# Patient Record
(demographics unavailable — no encounter records)

---

## 2022-06-20 RX ORDER — ALBUTEROL SULFATE 90 UG/1
AEROSOL, METERED RESPIRATORY (INHALATION)
COMMUNITY
Start: 2021-12-26 | End: 2022-09-20 | Stop reason: SDUPTHER

## 2022-06-28 RX ORDER — NAPROXEN 500 MG/1
TABLET ORAL
COMMUNITY

## 2022-06-28 RX ORDER — HYDROCHLOROTHIAZIDE 50 MG/1
TABLET ORAL
COMMUNITY

## 2022-06-28 RX ORDER — IBUPROFEN 600 MG/1
TABLET ORAL
COMMUNITY

## 2022-06-28 RX ORDER — INDOMETHACIN 50 MG/1
CAPSULE ORAL
COMMUNITY

## 2022-06-28 RX ORDER — IPRATROPIUM BROMIDE AND ALBUTEROL SULFATE 2.5; .5 MG/3ML; MG/3ML
SOLUTION RESPIRATORY (INHALATION)
COMMUNITY
Start: 2021-12-20 | End: 2022-09-20 | Stop reason: SDUPTHER

## 2022-06-28 RX ORDER — CLOBETASOL PROPIONATE 0.05 MG/G
GEL TOPICAL
COMMUNITY

## 2022-08-01 LAB
ALBUMIN SERPL-MCNC: 3.5 G/DL (ref 3.5–5.2)
ALBUMIN/GLOB SERPL: 1 {RATIO} (ref 1–2.7)
ALP SERPL-CCNC: 135 UNIT/L (ref 35–117)
ALT SERPL-CCNC: 75 UNIT/L (ref 0–35)
ANION GAP SERPL CALC-SCNC: 9 MMOL/L (ref 2–17)
APPEARANCE: CLEAR
AST SERPL-CCNC: 66 UNIT/L (ref 0–35)
BASOPHILS # BLD AUTO: 0 X10E3/MCL (ref 0–0.2)
BASOPHILS NFR BLD AUTO: 0.5 % (ref 0–2)
BILIRUB SERPL-MCNC: 0.62 MG/DL (ref 0–1.2)
BILIRUBIN, URINE, POC: NEGATIVE
BLOOD URINE, POC: NEGATIVE
BUN SERPL-MCNC: 17 MG/DL (ref 8–23)
CALCIUM SERPL-MCNC: 9.2 MG/DL (ref 8.8–10.2)
CHLORIDE SERPL-SCNC: 101 MMOL/L (ref 98–107)
CREAT SERPL-MCNC: 0.9 MG/DL (ref 0.5–1)
DEPRECATED HCO3 PLAS-SCNC: 34 MMOL/L (ref 22–29)
EOSINOPHIL # BLD AUTO: 0.1 X10E3/MCL (ref 0–0.5)
EOSINOPHIL NFR BLD AUTO: 1.4 % (ref 0–7)
ERYTHROCYTE [DISTWIDTH] IN BLOOD BY AUTOMATED COUNT: 13.6 % (ref 11–16)
GFR SERPL CREATININE-BSD FRML MDRD: 70 ML/MIN/1.73M²
GLOBULIN SER CALC-MCNC: 3.3 G/DL (ref 1.9–4.4)
GLUCOSE BLD-MCNC: 122 MG/DL (ref 65–110)
GLUCOSE SERPL-MCNC: 129 MG/DL (ref 70–99)
GLUCOSE URINE, POC: NEGATIVE MG/DL
HCT VFR BLD AUTO: 44 % (ref 34–47)
HGB BLD-MCNC: 13.9 G/DL (ref 11.5–15.7)
IMMATURE GRANS (ABS): 0.01 X10E3/MCL (ref 0–0.06)
IMMATURE GRANULOCYTES: 0.2 % (ref 0–0.6)
KETONES, URINE, POC: ABNORMAL MG/DL
LEUKOCYTE EST, POC: NEGATIVE
LYMPHOCYTES # SPEC AUTO: 1.5 X10E3/MCL (ref 1–3.2)
LYMPHOCYTES NFR BLD AUTO: 26 % (ref 15–45)
MCH RBC QN AUTO: 28.1 PG (ref 27–34.5)
MCHC RBC AUTO-ENTMCNC: 31.6 G/DL (ref 32–36)
MCV RBC AUTO: 89.1 FL (ref 81–99)
MONOCYTES # BLD AUTO: 0.6 X10E3/MCL (ref 0.3–1)
MONOCYTES NFR BLD AUTO: 10.3 % (ref 4–12)
NEUTROPHILS # BLD AUTO: 3.5 X10E3/MCL (ref 1.6–7.3)
NEUTROPHILS NFR BLD AUTO: 61.6 % (ref 42–74)
NITRATE, URINE POC: NEGATIVE
OSMOLALITY SERPL CALC.SUM OF ELEC: 290 MOSM/KG (ref 270–287)
PH, URINE, POC: 6.5 (ref 4.5–8)
PLATELET # BLD AUTO: 212 X10E3/MCL (ref 140–440)
PMV BLD AUTO: 11.6 FL (ref 7.2–13.2)
POTASSIUM SERPL-SCNC: 3.3 MMOL/L (ref 3.5–5.3)
PROT SERPL-MCNC: 6.8 G/DL (ref 6.4–8.3)
PROTEIN,URINE, POC: >=300
RBC # BLD AUTO: 4.94 X10E6/MCL (ref 3.6–5.2)
SODIUM SERPL-SCNC: 144 MMOL/L (ref 135–145)
SPECIFIC GRAVITY, URINE, POC: >=1.03 (ref 1–1.03)
URINALYSIS COLOR, POC: YELLOW
UROBILIN U POC: 1 EU/DL
WBC # BLD AUTO: 5.6 X10E3/MCL (ref 3.8–10.6)

## 2022-09-20 PROBLEM — M1A.0710 IDIOPATHIC CHRONIC GOUT OF RIGHT FOOT WITHOUT TOPHUS: Status: ACTIVE | Noted: 2022-09-20

## 2022-09-20 PROBLEM — M10.9 GOUT: Status: ACTIVE | Noted: 2022-09-20

## 2022-09-20 PROBLEM — J45.31 MILD PERSISTENT ASTHMA WITH ACUTE EXACERBATION: Status: ACTIVE | Noted: 2022-09-20

## 2022-09-20 PROBLEM — M79.671 PAIN IN RIGHT FOOT: Status: ACTIVE | Noted: 2022-09-20

## 2022-09-20 PROBLEM — N93.9 ABNORMAL VAGINAL BLEEDING: Status: ACTIVE | Noted: 2022-09-20

## 2022-09-20 PROBLEM — J45.909 ASTHMATIC BRONCHITIS: Status: ACTIVE | Noted: 2022-09-20

## 2022-09-20 PROBLEM — G47.33 OSA (OBSTRUCTIVE SLEEP APNEA): Status: ACTIVE | Noted: 2022-09-20

## 2022-09-20 PROBLEM — M54.30 SCIATICA: Status: ACTIVE | Noted: 2022-09-20

## 2022-09-20 PROBLEM — M17.0 PRIMARY OSTEOARTHRITIS OF BOTH KNEES: Status: ACTIVE | Noted: 2022-09-20

## 2022-09-20 PROBLEM — E01.0 THYROMEGALY: Status: ACTIVE | Noted: 2022-09-20

## 2022-09-20 PROBLEM — M17.9 OSTEOARTHRITIS OF KNEE: Status: ACTIVE | Noted: 2022-09-20

## 2022-09-20 PROBLEM — M79.609 PAIN IN LIMB: Status: ACTIVE | Noted: 2022-09-20

## 2022-09-20 PROBLEM — C44.310 BASAL CELL CARCINOMA OF FACE: Status: ACTIVE | Noted: 2022-09-20

## 2022-09-20 PROBLEM — M77.9 TENDONITIS: Status: ACTIVE | Noted: 2022-09-20

## 2022-09-20 PROBLEM — Z00.00 PREVENTATIVE HEALTH CARE: Status: ACTIVE | Noted: 2022-09-20

## 2022-09-20 PROBLEM — N93.9 ABNORMAL UTERINE BLEEDING: Status: ACTIVE | Noted: 2022-09-20

## 2022-09-20 PROBLEM — G62.9 PERIPHERAL NEUROPATHY: Status: ACTIVE | Noted: 2022-09-20
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
## 2022-09-20 PROBLEM — S89.92XA INJURY OF LEFT KNEE: Status: ACTIVE | Noted: 2022-09-20

## 2022-09-20 PROBLEM — E66.01 MORBID OBESITY (HCC): Status: ACTIVE | Noted: 2022-09-20

## 2022-09-20 PROBLEM — Z86.16 HISTORY OF COVID-19: Status: ACTIVE | Noted: 2022-09-20

## 2022-09-20 PROBLEM — I35.0 NONRHEUMATIC AORTIC VALVE STENOSIS: Status: ACTIVE | Noted: 2022-09-20

## 2022-09-20 PROBLEM — I47.1 PAROXYSMAL SUPRAVENTRICULAR TACHYCARDIA (HCC): Status: ACTIVE | Noted: 2022-09-20

## 2022-09-20 PROBLEM — M25.562 ACUTE PAIN OF LEFT KNEE: Status: ACTIVE | Noted: 2022-09-20

## 2022-09-20 PROBLEM — R41.3 MEMORY LOSS: Status: ACTIVE | Noted: 2022-09-20

## 2022-09-20 PROBLEM — G47.52 REM SLEEP BEHAVIOR DISORDER: Status: ACTIVE | Noted: 2022-09-20

## 2022-09-20 PROBLEM — E87.6 HYPOKALEMIA: Status: ACTIVE | Noted: 2022-09-20

## 2022-09-20 PROBLEM — I10 ESSENTIAL (PRIMARY) HYPERTENSION: Status: ACTIVE | Noted: 2022-09-20

## 2022-10-20 PROBLEM — Z00.00 PREVENTATIVE HEALTH CARE: Status: RESOLVED | Noted: 2022-09-20 | Resolved: 2022-10-20

## 2022-10-21 NOTE — ED NOTES
ED Patient Education Note     ;Patient Education Materials Follows:           Pneumonia, Adult  Pneumonia is an infection of the lungs. One type of pneumonia can happen while a person is in a hospital. A different type can happen when a person is not in a hospital (community-acquired pneumonia). It is easy for this kind to spread from person to person. It can spread to you if you breathe near an infected person who coughs or sneezes. Some symptoms include:   A dry cough.  A wet (productive) cough.  Fever.  Sweating.  Chest pain. HOME CARE   Take over-the-counter and prescription medicines only as told by your doctor. ? Only take cough medicine if you are losing sleep. ? If you were prescribed an antibiotic medicine, take it as told by your doctor. Do not stop taking the antibiotic even if you start to feel better.  Sleep with your head and neck raised (elevated). You can do this by putting a few pillows under your head, or you can sleep in a recliner.  Do not use tobacco products. These include cigarettes, chewing tobacco, and e-cigarettes. If you need help quitting, ask your doctor.  Drink enough water to keep your pee (urine) clear or pale yellow. A shot (vaccine) can help prevent pneumonia. Shots are often suggested for:   People older than 72years of age.  People older than 23years of age:  ? Who are having cancer treatment. ? Who have long-term (chronic) lung disease. ? Who have problems with their body's defense system (immune system). You may also prevent pneumonia if you take these actions:   Get the flu (influenza) shot every year.  Go to the dentist as often as told.  Wash your hands often. If soap and water are not available, use hand . GET HELP IF:   You have a fever.  You lose sleep because your cough medicine does not help. GET HELP RIGHT AWAY IF:   You are short of breath and it gets worse.  You have more chest pain.  Your sickness gets worse.  This is very serious if:  ? You are an older adult. ? Your body's defense system is weak.  You cough up blood. This information is not intended to replace advice given to you by your health care provider. Make sure you discuss any questions you have with your health care provider. Document Released: 06/05/2009 Document Revised: 09/07/2016 Document Reviewed: 04/13/2016  [employer] Patient Education [address].      Gastroenterology     Hypokalemia    Hypokalemia means that the amount of potassium in the blood is lower than normal. Potassium is a chemical (electrolyte) that helps regulate the amount of fluid in the body. It also stimulates muscle tightening (contraction) and helps nerves work properly. Normally, most of the body's potassium is inside cells, and only a very small amount is in the blood. Because the amount in the blood is so small, minor changes to potassium levels in the blood can be life-threatening. What are the causes? This condition may be caused by: Antibiotic medicine. Diarrhea or vomiting. Taking too much of a medicine that helps you have a bowel movement (laxative) can cause diarrhea and lead to hypokalemia. Chronic kidney disease (CKD). Medicines that help the body get rid of excess fluid (diuretics). Eating disorders, such as bulimia. Low magnesium levels in the body. Sweating a lot. What are the signs or symptoms? Symptoms of this condition include:   Weakness. Constipation. Fatigue. Muscle cramps. Mental confusion. Skipped heartbeats or irregular heartbeat (palpitations). Tingling or numbness. How is this diagnosed? This condition is diagnosed with a blood test.      How is this treated? This condition may be treated by:   Taking potassium supplements by mouth. Adjusting the medicines that you take. Eating more foods that contain a lot of potassium.       If your potassium level is very low, you may need to get potassium through an IV and be monitored in the hospital.      Follow these instructions at home:       Take over-the-counter and prescription medicines only as told by your health care provider. This includes vitamins and supplements. Eat a healthy diet. A healthy diet includes fresh fruits and vegetables, whole grains, healthy fats, and lean proteins. If instructed, eat more foods that contain a lot of potassium. This includes:  ? Nuts, such as peanuts and pistachios. ? Seeds, such as sunflower seeds and pumpkin seeds. ? Peas, lentils, and lima beans. ? Whole grain and bran cereals and breads. ? Fresh fruits and vegetables, such as apricots, avocado, bananas, cantaloupe, kiwi, oranges, tomatoes, asparagus, and potatoes. ? Orange juice. ? Tomato juice. ? Red meats. ? Yogurt. Keep all follow-up visits as told by your health care provider. This is important. Contact a health care provider if you:     Have weakness that gets worse. Feel your heart pounding or racing. Vomit. Have diarrhea. Have diabetes (diabetes mellitus) and you have trouble keeping your blood sugar (glucose) in your target range. Get help right away if you:     Have chest pain. Have shortness of breath. Have vomiting or diarrhea that lasts for more than 2 days. Faint. Summary     Hypokalemia means that the amount of potassium in the blood is lower than normal.     This condition is diagnosed with a blood test.     Hypokalemia may be treated by taking potassium supplements, adjusting the medicines that you take, or eating more foods that are high in potassium. If your potassium level is very low, you may need to get potassium through an IV and be monitored in the hospital.      This information is not intended to replace advice given to you by your health care provider.  Make sure you discuss any questions you have with your health care provider. Document Revised: 07/31/2019 Document Reviewed: 07/31/2019  Elsevier Patient Education ?  [address] [place].

## 2022-10-21 NOTE — ED NOTES
ED Patient Summary       ;          [facility]  [address], [place], [address] [place] [address]  [phone]  Discharge Instructions (Patient)  Bhargavi Matthew  :  1953                   MRN: [id number]                   FIN: [id number]  Reason For Visit: Nausea; COVID  +  /N/V  Final Diagnosis: Hypokalemia; Nausea; Pneumonia; Superficial bruising of abdominal wall; Transaminitis     Visit Date: 2022 08:41:00  Address:  [address]  Phone: [phone]     Emergency Department Providers:         Primary Physician:      [facility] would like to thank you for allowing us to assist you with your healthcare needs. The following includes patient education materials and information regarding your injury/illness. Follow-up Instructions: You were seen today on an emergency basis. Please contact your primary care doctor for a follow up appointment. If you received a referral to a specialist doctor, it is important you follow-up as instructed. It is important that you call your follow-up doctor to schedule and confirm the location of your next appointment. Your doctor may practice at multiple locations. The office location of your follow-up appointment may be different to the one written on your discharge instructions. If you do not have a primary care doctor, please call [phone] for help in finding a T[facility] Monge. [facility] Provider. For help in finding a specialist doctor, please call [phone]. If your condition gets worse before your follow-up with your primary care doctor or specialist, please return to the Emergency Department. Coronavirus 2019 (COVID-19) Reminders:     Patients age 15 - 24, with parental consent, and over age 25 can make an appointment for a COVID-19 vaccine.  Patients can contact their [facility] doctors' offices to schedule an appointment to receive the COVID-19 vaccine. Patients who do not have a [facility] physician can call [phone] to schedule vaccination appointments. Follow Up Appointments:  Primary Care Provider:     Name: PCP,  NONE     Phone:                  With: Address: When:   Follow up with primary care provider  Within 1 week   Comments: Follow-up with your doctor for recheck. You will need to have your potassium and your liver enzymes rechecked in 1 to 2 weeks. Return to the ER with worsening symptoms              [address] SERVICES%>             New Medications  Printed Prescriptions  levofloxacin (Levaquin 750 mg oral tablet) 1 Tabs Oral (given by mouth) every day for 5 Days. Refills: 0. Last Dose:____________________  ondansetron (Zofran ODT 4 mg oral tablet, disintegrating) 1 Tabs Oral (given by mouth) every 8 hours as needed nausea/vomiting for 3 Days. Refills: 0. Last Dose:____________________  Medications That Were Updated - Follow Current Instructions  Printed Prescriptions  Current potassium chloride (potassium chloride 20 mEq oral tablet, extended release) 1 Tabs Oral (given by mouth) 2 times a day for 5 Days. Refills: 0. Last Dose:____________________    Other Medications  Current potassium chloride (Klor-Con M20 oral tablet, extended release) 0.5 Tabs Oral (given by mouth) once a day (in the morning). Last Dose:____________________    Medications that have not changed  Other Medications  allopurinol (allopurinol 300 mg oral tablet) 1 Tabs Oral (given by mouth) once a day (in the morning). Last Dose:____________________  apixaban (Eliquis 5 mg oral tablet) 1 Tabs Oral (given by mouth) 2 times a day. Refills: 0. Last Dose:____________________  bumetanide (bumetanide 2 mg oral tablet) 1 Tabs Oral (given by mouth) once a day (in the morning).   Last Dose:____________________  diclofenac topical (diclofenac 1% topical gel) 1 Application Topical (on the skin) 4 times a day as needed pain. Last Dose:____________________  melatonin (Melatonin) 5 Milligram Oral (given by mouth) Once a Day (at bedtime). Last Dose:____________________  molnupiravir (molnupiravir 200 mg oral capsule) TAKE 4 CAPSULES BY MOUTH EVERY 12 HOURS FOR 5 DAYS. Last Dose:____________________  montelukast (montelukast 10 mg oral tablet) 1 Tabs Oral (given by mouth) once a day (in the morning). Last Dose:____________________      Allergy Info: Adhesive Bandage; codeine     Discharge Additional Information          Discharge Patient 08/01/22 10:09:00 EDT      Patient Education Materials:        Hypokalemia    Hypokalemia means that the amount of potassium in the blood is lower than normal. Potassium is a chemical (electrolyte) that helps regulate the amount of fluid in the body. It also stimulates muscle tightening (contraction) and helps nerves work properly. Normally, most of the body's potassium is inside cells, and only a very small amount is in the blood. Because the amount in the blood is so small, minor changes to potassium levels in the blood can be life-threatening. What are the causes? This condition may be caused by: Antibiotic medicine. Diarrhea or vomiting. Taking too much of a medicine that helps you have a bowel movement (laxative) can cause diarrhea and lead to hypokalemia. Chronic kidney disease (CKD). Medicines that help the body get rid of excess fluid (diuretics). Eating disorders, such as bulimia. Low magnesium levels in the body. Sweating a lot. What are the signs or symptoms? Symptoms of this condition include:   Weakness. Constipation. Fatigue. Muscle cramps. Mental confusion. Skipped heartbeats or irregular heartbeat (palpitations). Tingling or numbness. How is this diagnosed? This condition is diagnosed with a blood test.      How is this treated?     This condition may be treated by:   Taking potassium supplements by mouth. Adjusting the medicines that you take. Eating more foods that contain a lot of potassium. If your potassium level is very low, you may need to get potassium through an IV and be monitored in the hospital.      Follow these instructions at home:       Take over-the-counter and prescription medicines only as told by your health care provider. This includes vitamins and supplements. Eat a healthy diet. A healthy diet includes fresh fruits and vegetables, whole grains, healthy fats, and lean proteins. If instructed, eat more foods that contain a lot of potassium. This includes:  ? Nuts, such as peanuts and pistachios. ? Seeds, such as sunflower seeds and pumpkin seeds. ? Peas, lentils, and lima beans. ? Whole grain and bran cereals and breads. ? Fresh fruits and vegetables, such as apricots, avocado, bananas, cantaloupe, kiwi, oranges, tomatoes, asparagus, and potatoes. ? Orange juice. ? Tomato juice. ? Red meats. ? Yogurt. Keep all follow-up visits as told by your health care provider. This is important. Contact a health care provider if you:     Have weakness that gets worse. Feel your heart pounding or racing. Vomit. Have diarrhea. Have diabetes (diabetes mellitus) and you have trouble keeping your blood sugar (glucose) in your target range. Get help right away if you:     Have chest pain. Have shortness of breath. Have vomiting or diarrhea that lasts for more than 2 days. Faint. Summary     Hypokalemia means that the amount of potassium in the blood is lower than normal.     This condition is diagnosed with a blood test.     Hypokalemia may be treated by taking potassium supplements, adjusting the medicines that you take, or eating more foods that are high in potassium.      If your potassium level is very low, you may need to get potassium through an IV and be monitored in the hospital.      This information is not intended to replace advice given to you by your health care provider. Make sure you discuss any questions you have with your health care provider. Document Revised: 07/31/2019 Document Reviewed: 07/31/2019  [employer] Patient Education ? [address] [address] [place].             Pneumonia, Adult  Pneumonia is an infection of the lungs. One type of pneumonia can happen while a person is in a hospital. A different type can happen when a person is not in a hospital (community-acquired pneumonia). It is easy for this kind to spread from person to person. It can spread to you if you breathe near an infected person who coughs or sneezes. Some symptoms include:   A dry cough.  A wet (productive) cough.  Fever.  Sweating.  Chest pain. HOME CARE   Take over-the-counter and prescription medicines only as told by your doctor. ? Only take cough medicine if you are losing sleep. ? If you were prescribed an antibiotic medicine, take it as told by your doctor. Do not stop taking the antibiotic even if you start to feel better.  Sleep with your head and neck raised (elevated). You can do this by putting a few pillows under your head, or you can sleep in a recliner.  Do not use tobacco products. These include cigarettes, chewing tobacco, and e-cigarettes. If you need help quitting, ask your doctor.  Drink enough water to keep your pee (urine) clear or pale yellow. A shot (vaccine) can help prevent pneumonia. Shots are often suggested for:   People older than 72years of age.  People older than 23years of age:  ? Who are having cancer treatment. ? Who have long-term (chronic) lung disease. ? Who have problems with their body's defense system (immune system). You may also prevent pneumonia if you take these actions:   Get the flu (influenza) shot every year.  Go to the dentist as often as told.  Wash your hands often.  If soap and water are not available, use hand . GET HELP IF:   You have a fever.  You lose sleep because your cough medicine does not help. GET HELP RIGHT AWAY IF:   You are short of breath and it gets worse.  You have more chest pain.  Your sickness gets worse. This is very serious if:  ? You are an older adult. ? Your body's defense system is weak.  You cough up blood. This information is not intended to replace advice given to you by your health care provider. Make sure you discuss any questions you have with your health care provider. Document Released: 06/05/2009 Document Revised: 09/07/2016 Document Reviewed: 04/13/2016  [employer] Patient Education [address] [facility]      ---------------------------------------------------------------------------------------------------------------------  [facility] allows patients to review your COVID and other test results as well as discharge documents from any [facility], Emergency Department, surgical center or outpatient lab. Test results are typically available 36 hours after the test is completed. [address] encourages you to self-enroll in the [facility] Patient Portal.     To begin your self-enrollment process, please visit [email or web address] Under [facility], click on Sign up now. NOTE: You must be 16 years and older to use [facility] Self-Enroll online. If you are a parent, caregiver, or guardian; you need an invite to access your childs or dependents health records. To obtain an invite, contact the Medical Records department at [phone] Monday through Friday, 8-4:30, select option 3 . If we receive your call afterhours, we will return your call the next business day. If you have issues trying to create or access your account, contact [employer] at [phone] available 7 days a week 24 hours a day.      Comment:

## 2022-10-21 NOTE — DISCHARGE SUMMARY
ED Clinical Summary                         [facility]  [address]  [place], [place], [address] [phone]           PERSON INFORMATION  Name: Yumiko Lai Age:  76 Years : 1953   Sex: Female Language: English PCP: PCP,  NONE   Marital Status:   Phone: [phone] Med Service: MED-Medicine   MRN:  [id number] Acct# [[id number]] Arrival: 2022 08:41:00   Visit Reason: Nausea; COVID  +  /N/V Acuity: 3 LOS: 000 01:34   Address:      [address] [place] [id number]  Diagnosis:      Hypokalemia; Nausea; Pneumonia; Superficial bruising of abdominal wall; Transaminitis  Printed Prescriptions: Allergies      codeine (Vomiting)      Adhesive Bandage (Burning) (Rash) (Itching)      Medications Administered During Visit:                  Medication Dose Route   ondansetron 4 mg IV Push   potassium chloride 40 mEq Oral       Patient Medication List:              allopurinol (allopurinol 300 mg oral tablet) 1 Tabs Oral (given by mouth) once a day (in the morning). apixaban (Eliquis 5 mg oral tablet) 1 Tabs Oral (given by mouth) 2 times a day. Refills: 0.  bumetanide (bumetanide 2 mg oral tablet) 1 Tabs Oral (given by mouth) once a day (in the morning). diclofenac topical (diclofenac 1% topical gel) 1 Application Topical (on the skin) 4 times a day as needed pain. levofloxacin (Levaquin 750 mg oral tablet) 1 Tabs Oral (given by mouth) every day for 5 Days. Refills: 0.  melatonin (Melatonin) 5 Milligram Oral (given by mouth) Once a Day (at bedtime). molnupiravir (molnupiravir 200 mg oral capsule) TAKE 4 CAPSULES BY MOUTH EVERY 12 HOURS FOR 5 DAYS. montelukast (montelukast 10 mg oral tablet) 1 Tabs Oral (given by mouth) once a day (in the morning). ondansetron (Zofran ODT 4 mg oral tablet, disintegrating) 1 Tabs Oral (given by mouth) every 8 hours as needed nausea/vomiting for 3 Days.  Refills: 0.  potassium chloride (Klor-Con M20 oral tablet, extended release) 0.5 Tabs Oral (given by mouth) once a day (in the morning). potassium chloride (potassium chloride 20 mEq oral tablet, extended release) 1 Tabs Oral (given by mouth) 2 times a day for 5 Days. Refills: 0. Major Tests and Procedures: The following procedures and tests were performed during your ED visit. COMMONPROCEDURES%>  COMMON PROCEDURESCOMMENTS%>          Laboratory Orders  Name Status Details   . Glu POC Completed Blood, RT, RT - Routine, Collected, 08/01/22 9:28:53 EDT, Nurse collect, 08/01/22 9:28:53 /[place], [id number] POC Login   . UA POC Completed Urine, RT, RT - Routine, Collected, 08/01/22 9:58:00 EDT, Nurse collect, 08/01/22 9:58:00 /[place], RAL POC Login   CBCDIFF Completed Blood, Stat, ST - Stat, 08/01/22 9:02:00 EDT, 08/01/22 9:02:00 EDT, Nurse collect, Bella MCFADDEN, Print label Y/N   CMP Completed Blood, Stat, ST - Stat, 08/01/22 9:02:00 EDT, 08/01/22 9:02:00 EDT, Nurse collectCompa, Print label Y/N               Radiology Orders  Name Status Details   XR Chest 1 View Portable Completed 08/01/22 9:02:00 EDT, STAT 1 hour or less, Reason: Other abnormalities of breathing, Transport Mode: Portable, pp_set_radiology_subspecialty               Patient Care Orders  Name Status Details   COVID-19 Status Ordered 08/01/22 8:42:03 EDT, NOT [facility] pharmacy, laboratory, radiology. , 08/01/22 8:42:03 EDT, COVID-19 Detected   Discharge Patient Ordered 08/01/22 10:09:00 EDT   ED Assessment Adult Completed 08/01/22 8:47:54 EDT, 08/01/22 8:47:54 EDT   ED Secondary Triage Completed 08/01/22 8:47:54 EDT, 08/01/22 8:47:54 EDT   ED Triage Adult Completed 08/01/22 8:42:03 EDT, 08/01/22 8:42:03 EDT   POC-Blood Glucose Monitoring Completed 08/01/22 9:02:00 EDT, Once, 08/01/22 9:02:00 EDT   POC-Urine Dipstick collect Completed 08/01/22 9:10:00 EDT, Once, 08/01/22 9:10:00 EDT   Place Isolation Order Ordered 08/01/22 8:42:04 EDT, 08/01/22 8:42:04 EDT   Saline Lock Insert Completed 08/01/22 9:02:00 EDT, Once, 08/01/22 9:02:00 EDT             PROVIDER INFORMATION               Provider Role Assigned Wyona Eisenmenger, [employer] N-RN ED Nurse 8/1/2022 08:49:21    Nadia Cardona [facility] ED Provider 8/1/2022 08:50:03    Yasmin DE LA ROSA ED Provider 8/1/2022 08:50:08 8/1/2022 08:50:13       Attending Physician:  Nadia MCFADDEN     Admit Doc  Nadia MCFADDEN     Consulting Doc       VITALS INFORMATION  Vital Sign Triage Latest   Temp Oral ORAL_1%>36.6 degC ORAL%>36.6 degC   Temp Temporal TEMPORAL_1%> TEMPORAL%>   Temp Intravascular INTRAVASCULAR_1%> INTRAVASCULAR%>   Temp Axillary AXILLARY_1%> AXILLARY%>   Temp Rectal RECTAL_1%> RECTAL%>   02 Sat 96 % 95 %   Respiratory Rate RATE_1%>18 br/min RATE%>18 br/min   Peripheral Pulse Rate PULSE RATE_1%> PULSE RATE%>   Apical Heart Rate HEART RATE_1%> HEART RATE%>   Blood Pressure BLOOD PRESSURE_1%>/ BLOOD PRESSURE_1%>73 mmHg BLOOD PRESSURE%>106 mmHg / BLOOD PRESSURE%>65 mmHg                 Immunizations      No Immunizations Documented This Visit          DISCHARGE INFORMATION   Discharge Disposition: H Outpt-Sent Home   Discharge Location:    Home   Discharge Date and Time:    8/1/2022 10:15:00   ED Checkout Date and Time:    8/1/2022 10:15:00     DEPART REASON INCOMPLETE INFORMATION               Depart Action Incomplete Reason   Interactive View/I&O Recently assessed               Problems      No Problems Documented              Smoking Status      Never (less than 100 in lifetime)         PATIENT EDUCATION INFORMATION  Instructions:       Hypokalemia; Pneumonia, Adult, Easy-to-Read ([id number])     Follow up:                    With: Address: When:   Follow up with primary care provider  Within 1 week   Comments: Follow-up with your doctor for recheck. You will need to have your potassium and your liver enzymes rechecked in 1 to 2 weeks.  Return to the ER with worsening symptoms ED PROVIDER DOCUMENTATION     Patient:   Howie Jackson            MRN: [id number]            FIN: [id number]               Age:   76 years     Sex:  Female     :  1953   Associated Diagnoses:   Nausea; Superficial bruising of abdominal wall; Transaminitis; Hypokalemia; Pneumonia   Author:   Chasidy MCFADDEN      Basic Information   Time seen: Provider Seen (ST)   ED Provider/Time:    Chasidy MCFADDEN / 2022 08:50  . Additional information: Chief Complaint from Nursing Triage Note   Chief Complaint  Chief Complaint: continued nausea and malaise since dx c covid monday. denies sob. nad (22 08:43:00). History of Present Illness   This is a 51-year-old female with history of atrial fibrillation newly on Eliquis in addition to CHF who presents for evaluation of nausea. She tested positive for COVID last Monday. Over the weekend she had some chills. She was hospitalized during that weekend but not tested for COVID. She was seen at an [facility] and believes she completed a course of an antiviral.  Says she is out of Zofran and says this normally helps with nausea, but has not have anything for her nausea at this time. Denies any vomiting, abdominal pain or diarrhea. No dysuria. Denies fever or chills. She still has a productive cough. She is not short of breath. Does report that she has a bruise to her right lower quadrant and site of Lovenox injection. It is not painful, but she does have a large area of ecchymosis. Review of Systems   Constitutional symptoms:  No fever,    Respiratory symptoms:  Cough. Gastrointestinal symptoms:  Nausea, no abdominal pain, no vomiting, no diarrhea. Genitourinary symptoms:  No dysuria,    Hematologic/Lymphatic symptoms:  Bruising tendency. Additional review of systems information: All other systems reviewed and otherwise negative. Health Status   Allergies:     Allergic Reactions Oxygen Information   8/1/2022 8:53 EDT Oxygen Therapy Room air   8/1/2022 8:43 EDT Oxygen Therapy Room air    SpO2 96 %   . General:  Alert, no acute distress. Skin:  Warm, dry, pink, intact, Moderate sized superficial ecchymosis/hematoma to right lower quadrant of the abdomen. Patient reports this was in the site of her Lovenox injection. It is not firm or tender to touch. No active bleeding. Head:  Normocephalic, atraumatic. Neck:  Supple, trachea midline, no tenderness. Eye:  Pupils are equal, round and reactive to light, extraocular movements are intact, normal conjunctiva. Ears, nose, mouth and throat:  Oral mucosa moist.   Cardiovascular:  Regular rate and rhythm, No murmur, Normal peripheral perfusion, No edema. Respiratory:  Lungs are clear to auscultation, respirations are non-labored, breath sounds are equal, Symmetrical chest wall expansion. Chest wall:  No tenderness. Back:  Nontender, Normal range of motion. Musculoskeletal:  Normal ROM, normal strength, no swelling. Gastrointestinal:  Soft, Nontender, Non distended, Guarding: Negative, Rebound: Negative. Neurological:  Alert and oriented to person, place, time, and situation, No focal neurological deficit observed. Lymphatics:  No lymphadenopathy. Medical Decision Making   Differential Diagnosis:  Pancreatitis, hepatitis, electrolyte abnormality, viral syndrome, Pneumonia. Documents reviewed:  Emergency department nurses' notes, emergency department records, prior records. Electrocardiogram:  Emergency Provider interpretation performed by me, See ECG ED Review.     Results review:  Lab results : Lab View   8/1/2022 9:58 EDT Appear U POC Clear    Color U POC Yellow    Bili U POC Negative    Blood U POC Negative    Glucose U POC Negative mg/dL    Ketones U POC Trace mg/dL    Leuk Est U POC Negative    Nitrite U POC Negative    pH U POC 6.5    Protein U POC >=300    Spec Grav U POC >=1.030    Urobilin U POC 1.0 EU/dL   8/1/2022 9:51 EDT Estimated Creatinine Clearance 82.61 mL/min   8/1/2022 9:28 EDT Glucose .0 mg/dL  HI   8/1/2022 9:17 EDT WBC 5.6 x10e3/mcL    RBC 4.94 x10e6/mcL    Hgb 13.9 g/dL    HCT 44.0 %    MCV 89.1 fL    MCH 28.1 pg    MCHC 31.6 g/dL  LOW    RDW 13.6 %    Platelet 681 K55I3/GCD    MPV 11.6 fL    Neutro Auto 61.6 %    Neutro Absolute 3.5 x10e3/mcL    Immature Grans Percent 0.2 %    Immature Grans Absolute 0.01 x10e3/mcL    Lymph Auto 26.0 %    Lymph Absolute 1.5 x10e3/mcL    Mono Auto 10.3 %    Mono Absolute 0.6 x10e3/mcL    Eosinophil Percent 1.4 %    Eos Absolute 0.1 x10e3/mcL    Basophil Auto 0.5 %    Baso Absolute 0.0 x10e3/mcL    Sodium Lvl 144 mmol/L    Potassium Lvl 3.3 mmol/L  LOW    Chloride 101 mmol/L    CO2 34 mmol/L  HI    Glucose Random 129 mg/dL  HI    BUN 17 mg/dL    Creatinine Lvl 0.9 mg/dL    AGAP 9 mmol/L    Osmolality Calc 290 mOsm/kg  HI    Calcium Lvl 9.2 mg/dL    Protein Total 6.8 g/dL    Albumin Lvl 3.5 g/dL    Globulin Calc 3.3 g/dL    AG Ratio Calc 1.00    Alk Phos 135 unit/L  HI    AST 66 unit/L  HI    ALT 75 unit/L  HI    eGFR 70 mL/min/1.73mÂ²  LOW    Bili Total 0.62 mg/dL   8/1/2022 8:47 EDT Estimated Creatinine Clearance 106.21 mL/min   . Radiology results:  Rad Results (ST)   XR Chest 1 View Portable  ?  08/01/22 09:23:04  XR Chest 1 View Portable 08/01/22    INDICATION: Other abnormalities of breathing    COMPARISON: 23 July, 2022    TECHNIQUE: Single view    FINDINGS: Interval clearing of pulmonary infiltrates with some residual  infiltrate present most apparent in the right mid zone and in the left upper  lobe. The left upper lobe infiltrate is partially obscured by artifact. The  cardiac size is normal. The pulmonary vasculature is not congested. IMPRESSION: There is artifact over the chest. Right mid zone and left upper lobe  clinical correlation  Recommended.   ?  Signed By: Millie MARQUES  .   Will screen with basic labs to rule out any significant electrolyte disturbance, transaminitis or anemia. Patient does not look dehydrated. She has normal vital signs. We will give IV Zofran. Nausea likely secondary to mild persistent viral syndrome. She does report a productive cough we will obtain a chest x-ray. Lungs are clear and room air sats are greater than 95%. She does not appear short of breath      Reexamination/ Reevaluation   Given evidence of pulmonary infiltrate, I will put the patient on Levaquin. She does report her cough is more productive. She is got mild transaminitis. No recent prior for comparison. Recommend follow-up with her PCP for recheck. Potassium is a little low which was orally repleted. Plan for discharge with follow-up with her regular doctor      Impression and Plan   Diagnosis   Nausea (GKS54-FZ R11.0, Discharge, Medical)   Superficial bruising of abdominal wall (KHQ77-WS S30. 1XXA, Discharge, Medical)   Transaminitis (IZL85-ZF R74.01, Discharge, Medical)   Pneumonia (ZZM14-MT J18.9, Discharge, Medical)   Hypokalemia (HHE87-NZ E87.6, Discharge, Medical)   Plan   Condition: Improved. Disposition: Discharged: Time  8/1/2022 10:08:00, to home. Prescriptions: Launch prescriptions   Pharmacy:  Levaquin 750 mg oral tablet (Prescribe): 750 mg, 1 tabs, Oral, Daily, for 5 days, 5 tabs, 0 Refill(s)  potassium chloride 20 mEq oral tablet, extended release (Prescribe): 20 mEq, 1 tabs, Oral, BID, for 5 days, 10 tabs, 0 Refill(s), Launch prescriptions   Pharmacy:  Zofran ODT 4 mg oral tablet, disintegrating (Prescribe): 4 mg, 1 tabs, Oral, q8hr, for 3 days, PRN: nausea/vomiting, 9 tabs, 0 Refill(s). Patient was given the following educational materials: Pneumonia, Adult, Easy-to-Read ([id number]), Hypokalemia. Follow up with: Follow up with primary care provider Within 1 week Follow-up with your doctor for recheck. You will need to have your potassium and your liver enzymes rechecked in 1 to 2 weeks.   Return to the ER with worsening symptoms. Counseled: I had a detailed discussion with the patient and/or guardian regarding the historical points/exam findings supporting the discharge diagnosis and need for outpatient followup. Discussed the need to return to the ER if symptoms persist/worsen, or for any questions/concerns that arise at home.

## 2022-10-21 NOTE — PROGRESS NOTES
Pharmacy Clinical Interventions - Text       Pharmacy Clinical Interventions Entered On:  8/1/2022 9:57 EDT    Performed On:  8/1/2022 9:57 EDT by LATRICE Bolaños               Interventions   Intervention Type Pharmacy :   Optimize monitoring   Associated Order(s) Pharmacy :   K = 3.3   Clinical Importance Pharmacy :   Routine safety/clinical monitoring with no change needed   Medication Safety Reporting Pharmacy :   Non Medication Event   Pharmacist Intervention Time :   1-5 Minutes   Archie CUETO, LATRICE ROMAN - 8/1/2022 9:57 EDT

## 2022-10-21 NOTE — ED NOTES
ED Triage Note       ED Secondary Triage Entered On:  8/1/2022 8:53 EDT    Performed On:  8/1/2022 8:51 EDT by Alexi Patient N-RN               General Information   Barriers to Learning :   None evident   COVID-19 Vaccine Status :   Not received   ED Home Meds Section :   Document assessment   [facility] ED Fall Risk Section :   Document assessment   ED Advance Directives Section :   Document assessment   ED Palliative Screen :   Document assessment   Alexi Patient N-RN - 8/1/2022 8:51 EDT   (As Of: 8/1/2022 08:53:08 EDT)   Problems(Active)    Acid reflux (SNOMED CT  :923270424 )  Name of Problem:   Acid reflux ; Recorder:   Regine Wells RN, Joao Sher; Confirmation:   Confirmed ; Classification:   Patient Stated ; Code:   [id number] ; Contributor System:   PowerChart ; Last Updated:   3/3/2021 14:24 EST ; Life Cycle Date:   3/3/2021 ; Life Cycle Status:   Active ; Vocabulary:   SNOMED CT        CHF (congestive heart failure) (SNOMED CT  :37812548 )  Name of Problem:   CHF (congestive heart failure) ; Recorder:   Joao Snyder RN; Confirmation:   Confirmed ; Classification:   Patient Stated ; Code:   00228881 ; Contributor System:   PowerChart ; Last Updated:   3/3/2021 14:23 EST ; Life Cycle Date:   3/3/2021 ; Life Cycle Status:   Active ; Vocabulary:   SNOMED CT        Claustrophobia (SNOMED CT  :18248451 )  Name of Problem:   Claustrophobia ; Recorder:   Mayito Brown; Confirmation:   Confirmed ; Classification:   Patient Stated ; Code:   04860539 ; Contributor System:   PowerChart ; Last Updated:   9/18/2018 12:41 EDT ; Life Cycle Date:   9/18/2018 ; Life Cycle Status:   Active ; Vocabulary:   SNOMED CT   ; Comments:        9/18/2018 12:41 - Maggie Ruano RN, Dhiraj Avilez  DOES OK WITH ELEVATORS      Heart murmur (SNOMED CT  :726647725 )  Name of Problem:   Heart murmur ; Recorder:   Mayito rBown; Confirmation:   Confirmed ; Classification:   Patient Stated ; Code:   534420226 ; Contributor System:   PowerChart ;  Last Updated: 9/18/2018 12:15 EDT ; Life Cycle Date:   9/18/2018 ; Life Cycle Status:   Active ; Vocabulary:   SNOMED CT        History of basal cell carcinoma (BCC) excision (SNOMED CT  :306755895 )  Name of Problem:   History of basal cell carcinoma (BCC) excision ; Recorder:   Jordan Perla RN, ROBERT REYEZ; Confirmation:   Confirmed ; Classification:   Patient Stated ; Code:   137490904 ; Contributor System:   PowerChart ; Last Updated:   11/16/2018 12:25 EST ; Life Cycle Date:   11/16/2018 ; Life Cycle Status:   Active ; Vocabulary:   SNOMED CT        Hx of gout (SNOMED CT  :439432833 )  Name of Problem:   Hx of gout ; Recorder:   Jordan Perla RN, ROBERT REYEZ; Confirmation:   Confirmed ; Classification:   Patient Stated ; Code:   897369992 ; Contributor System:   PowerChart ; Last Updated:   11/16/2018 12:24 EST ; Life Cycle Date:   11/16/2018 ; Life Cycle Status:   Active ; Vocabulary:   SNOMED CT        Hypertension (SNOMED CT  :1526389554 )  Name of Problem:   Hypertension ; Recorder:   Ximena Lugo RN, Steele Lefort; Confirmation:   Confirmed ; Classification:   Patient Stated ; Code:   2702498455 ; Contributor System:   PowerChart ; Last Updated:   3/3/2021 14:24 EST ; Life Cycle Date:   3/3/2021 ; Life Cycle Status:   Active ; Vocabulary:   SNOMED CT        Mild asthma (SNOMED CT  :6133025270 )  Name of Problem:   Mild asthma ; Recorder:   Eleanor Garcia; Confirmation:   Confirmed ; Classification:   Patient Stated ; Code:   4348869330 ; Contributor System:   PowerChart ; Last Updated:   9/18/2018 12:40 EDT ; Life Cycle Date:   9/18/2018 ; Life Cycle Status:   Active ; Vocabulary:   SNOMED CT        Mild depression (SNOMED CT  :462280849 )  Name of Problem:   Mild depression ; Recorder:   Eleanor Garcia; Confirmation:   Confirmed ; Classification:   Patient Stated ; Code:   229058285 ; Contributor System:   PowerChart ; Last Updated:   9/18/2018 12:40 EDT ;  Life Cycle Date:   9/18/2018 ; Life Cycle Status:   Active ; Vocabulary:   SNOMED CT Neuropathy (SNOMED CT  :4389709849 )  Name of Problem:   Neuropathy ; Recorder:   Ivone Casiano; Confirmation:   Confirmed ; Classification:   Patient Stated ; Code:   0933517789 ; Contributor System:   PowerChart ; Last Updated:   9/18/2018 12:41 EDT ; Life Cycle Date:   9/18/2018 ; Life Cycle Status:   Active ; Vocabulary:   SNOMED CT        Osteoarthritis (SNOMED CT  :3207276267 )  Name of Problem:   Osteoarthritis ; Recorder:   Ivone Casiano; Confirmation:   Confirmed ; Classification:   Patient Stated ; Code:   0167748347 ; Contributor System:   PowerChart ; Last Updated:   11/16/2018 12:25 EST ; Life Cycle Status:   Active ; Vocabulary:   SNOMED CT   ; Comments:        11/16/2018 12:25 - SUSANNE ABDALLA SUSAN L  painful knees      Psoriasis (SNOMED CT  :60296055 )  Name of Problem:   Psoriasis ; Recorder:   Ivone Casiano; Confirmation:   Confirmed ; Classification:   Patient Stated ; Code:   64306221 ; Contributor System:   PowerChart ; Last Updated:   9/18/2018 12:40 EDT ; Life Cycle Date:   9/18/2018 ; Life Cycle Status:   Active ; Vocabulary:   SNOMED CT        Rash (SNOMED CT  :845231373 )  Name of Problem:   Rash ; Recorder:   SUSANNE ABDALLA SUSAN L; Confirmation:   Confirmed ; Classification:   Patient Stated ; Code:   490242958 ; Contributor System:   PowerChart ; Last Updated:   11/16/2018 12:24 EST ; Life Cycle Status:   Active ; Vocabulary:   SNOMED CT   ; Comments:        11/16/2018 12:24 - SUSANNE ABDALLA SUSAN L  both hips    1/3/2019 9:32 - Kirby Ley RN, SUSAN L  chronic      Rosacea (SNOMED CT  :2848369662 )  Name of Problem:   Rosacea ; Recorder:   Ivone Casiano; Confirmation:   Confirmed ; Classification:   Patient Stated ; Code:   2508437719 ; Contributor System:   PowerChart ; Last Updated:   9/18/2018 12:40 EDT ;  Life Cycle Date:   9/18/2018 ; Life Cycle Status:   Active ; Vocabulary:   SNOMED CT        SVT (supraventricular tachycardia) (SNOMED CT  :34925802 )  Name of Problem:   SVT (supraventricular tachycardia) ; Recorder:   Emogene Card; Confirmation:   Confirmed ; Classification:   Patient Stated ; Code:   31750956 ; Contributor System:   PowerChart ; Last Updated:   3/3/2021 14:23 EST ; Life Cycle Status:   Active ; Vocabulary:   SNOMED CT   ; Comments:        3/3/2021 14:23 - Keily Baker RN, Yeni Millard  no meds now        Diagnoses(Active)    Nausea  Date:   8/1/2022 ; Diagnosis Type:   Reason For Visit ; Confirmation:   Complaint of ; Clinical Dx:   Nausea ; Classification:   Medical ; Clinical Service:   Emergency medicine ; Code:   PNED ; Probability:   0 ; Diagnosis Code:   [id number]             -    Procedure History   (As Of: 8/1/2022 08:53:08 EDT)     Anesthesia Minutes:   0 ; Procedure Name:   Cholecystectomy ; Procedure Minutes:   0            Anesthesia Minutes:   0 ; Procedure Name:   COLONOSCOPY/ENDOSCOPY ; Procedure Minutes:   0            Anesthesia Minutes:   0 ; Procedure Name:   EXCISION OF BASAL CELL CA FROM NOSE ; Procedure Minutes:   0            Anesthesia Minutes:   0 ; Procedure Name:   [address] ; Procedure Minutes:   0            Procedure Dt/Tm:   9/21/2018 11:12:00 EDT ; Location:    OR ; Provider:   Mahsa Duke; Anesthesia Type:   General ; :   Scott CHRISTY; Anesthesia Minutes:   0 ; Procedure Name:   Hysteroscopy with or without Dilatation and Curettage (With) ; Procedure Minutes:   46 ; Comments:     9/21/2018 12:15 EDT - Carlos Schmidt RN, Roscoe Steel  auto-populated from documented surgical case ; Clinical Service:   Surgery            Procedure Dt/Tm:   11/20/2018 07:59:00 EST ; Location:    Endoscopy ; Provider:   Lian Salgado;  Anesthesia Type:   Monitored Anesthesia Care ; :   LEONARD DEVRIES; Anesthesia Minutes:   0 ; Procedure Name:   Colonoscopy ; Procedure Minutes:   15 ; Comments:     11/20/2018 8:16 EST - Stanly Scheuermann, RN, Kathleen  auto-populated from documented surgical case ; Clinical Service:   Surgery            Procedure Dt/Tm:   1/11/2019 08:13:00 EST ; Location:    OR ; Provider:   Lakeisha Guy; Anesthesia Type:   General ; :   FIDENCIO LOMAS,  Laverne Bonilla; Anesthesia Minutes:   0 ; Procedure Name:   Hysterectomy with Node Dissection Robot Assisted SCIP (Bilateral, Fallopian Tube, Ovary) ; Procedure Minutes:   128 ; Comments:     1/11/2019 10:36 NAIMA FRITZ RN, HAO TOM  auto-populated from documented surgical case ; Clinical Service:   Surgery            Procedure Dt/Tm:   1990 ; Anesthesia Minutes:   0 ; Procedure Name:   Anal Sphinterotomy ; Procedure Minutes:   0            Procedure Dt/Tm:   8079 ; Anesthesia Minutes:   0 ; Procedure Name:   Right Knee Meniscectomy ; Procedure Minutes:   0            Procedure Dt/Tm:   3/10/2021 10:43:00 EST ; Location:    Endoscopy ; Provider:   Arvind Enamorado; Anesthesia Type:   Monitored Anesthesia Care ; :   Wilhemenia Friendly; Anesthesia Minutes:   0 ; Procedure Name:   Kendy Hoyos ; Procedure Minutes:   4 ; Comments:     3/10/2021 10:50 NAIMA MUNIZ RN, AMBER R  auto-populated from documented surgical case ; Clinical Service:   Surgery            Procedure Dt/Tm:   3/10/2021 10:43:00 EST ; Location:    Endoscopy ; Provider:   Arvind Enamorado; Anesthesia Type:   Monitored Anesthesia Care ; :   Wilhemenia Friendly;  Anesthesia Minutes:   0 ; Procedure Name:   Kendy Hoyos with Biopsy / Pauline Pry ; Procedure Minutes:   4 ; Comments:     3/10/2021 10:50 NAIMA MUNIZ RN, AMBER R  auto-populated from documented surgical case ; Clinical Service:   Surgery            Procedure Dt/Tm:   1/7/2022 12:38:00 EST ; Location:    Endoscopy ; Provider:   Arvind Enamorado; Anesthesia Type:   Monitored Anesthesia Care ; :   Jacqualyn Cathy; Anesthesia Minutes:   0 ; Procedure Name:   Colonoscopy with Polyp Cold ; Procedure Minutes:   16 ; Comments:     1/7/2022 12:59 NAIMA Grubbs RN, Dipti Cruz auto-populated from documented surgical case ; Clinical Service:   Surgery            Procedure Dt/Tm:   1/7/2022 12:38:00 EST ; Location:   [facility] ; Provider:   Jono Grant; Anesthesia Type:   Monitored Anesthesia Care ; :   Bhavna Lang; Anesthesia Minutes:   0 ; Procedure Name:   Colonoscopy ; Procedure Minutes:   16 ; Comments:     1/7/2022 12:59 EST - Rudy Whitfield RN, Sheryle Stairs  auto-populated from documented surgical case ; Clinical Service:   Surgery            [facility] Fall Risk Assessment Tool   Hx of falling last 3 months ED Fall :   No   Patient confused or disoriented ED Fall :   No   Patient intoxicated or sedated ED Fall :   No   Patient impaired gait ED Fall :   No   Use a mobility assistance device ED Fall :   No   Patient altered elimination ED Fall :   No   [facility] ED Fall Score :   0    Brandi GARCIA - 8/1/2022 8:51 EDT   ED Advance Directive   Advance Directive :   No   Brandi GARCIA - 8/1/2022 8:51 EDT   Palliative Care   Does the Patient have a Life Limiting Illness :   None of the above   Brandi GARCIA - 8/1/2022 8:51 EDT   Med Hx   Medication List   (As Of: 8/1/2022 08:53:08 EDT)   Prescription/Discharge Order    apixaban  :   apixaban ; Status:   Prescribed ; Ordered As Mnemonic:   Eliquis 5 mg oral tablet ; Simple Display Line:   5 mg, 1 tabs, Oral, BID, 60 tabs, 0 Refill(s) ; Ordering Provider:   Viktoriya Santana; Catalog Code:   apixaban ; Order Dt/Tm:   7/24/2022 11:52:58 EDT          diltiazem  :   diltiazem ; Status:   Completed ; Ordered As Mnemonic:   Cardizem  mg/24 hours oral capsule, extended release ; Simple Display Line:   180 mg, 1 cap, Oral, Daily, 30 cap, 0 Refill(s) ; Ordering Provider:   Viktoriya Santana; Catalog Code:   diltiazem ;  Order Dt/Tm:   7/24/2022 11:52:58 EDT            Home Meds    molnupiravir  :   molnupiravir ; Status:   Documented ; Ordered As Mnemonic:   molnupiravir 200 mg oral capsule ; Simple Display Line:   TAKE 4 CAPSULES BY MOUTH EVERY 12 HOURS FOR 5 DAYS ; Catalog Code:   molnupiravir ; Order Dt/Tm:   8/1/2022 08:52:51 EDT          famotidine  :   famotidine ; Status:   Completed ; Ordered As Mnemonic:   famotidine 40 mg oral tablet ; Simple Display Line:   40 mg, 1 tabs, Oral, qAM, 0 Refill(s) ; Catalog Code:   famotidine ; Order Dt/Tm:   3/3/2021 14:19:52 EST          diclofenac topical  :   diclofenac topical ; Status:   Documented ; Ordered As Mnemonic:   diclofenac 1% topical gel ; Simple Display Line:   1 anna, Topical, QID, PRN: pain, 100 g, 0 Refill(s) ; Catalog Code:   diclofenac topical ; Order Dt/Tm:   3/3/2021 14:18:17 EST          montelukast  :   montelukast ; Status:   Documented ; Ordered As Mnemonic:   montelukast 10 mg oral tablet ; Simple Display Line:   10 mg, 1 tabs, Oral, qAM, 0 Refill(s) ; Catalog Code:   montelukast ; Order Dt/Tm:   3/3/2021 14:18:38 EST          bumetanide  :   bumetanide ; Status:   Documented ; Ordered As Mnemonic:   bumetanide 2 mg oral tablet ; Simple Display Line:   2 mg, 1 tabs, Oral, qAM, 0 Refill(s) ; Catalog Code:   bumetanide ; Order Dt/Tm:   3/3/2021 14:19:29 EST          ibuprofen  :   ibuprofen ; Status:   Completed ; Ordered As Mnemonic:   Motrin oral tablet ; Simple Display Line:   800 mg, Oral, q4hr, PRN: as needed for pain, 0 Refill(s) ; Catalog Code:   ibuprofen ; Order Dt/Tm:   9/26/2020 07:45:47 EDT          melatonin  :   melatonin ; Status:   Documented ; Ordered As Mnemonic:   Melatonin ; Simple Display Line:   5 mg, Oral, Once a Day (at bedtime), 0 Refill(s) ; Catalog Code:   melatonin ; Order Dt/Tm:   9/6/2019 04:33:07 EDT          calcium-vitamin D  :   calcium-vitamin D ; Status:   Completed ; Ordered As Mnemonic:   [place]co 500 with D 500 mg-200 intl units oral tablet ; Simple Display Line:   1 tabs, Oral, BID, 0 Refill(s) ;  Ordering Provider:   Ameena Isaacs; Catalog Code:   calcium-vitamin D ; Order Dt/Tm:   11/16/2018 11:36:56 EST          albuterol  :   albuterol ; Status:   Completed ; Ordered As Mnemonic:   ProAir HFA 90 mcg/inh inhalation aerosol ; Simple Display Line:   2 puffs, Inhale, q4hr, PRN: shortness of breath or wheezing, 0 Refill(s) ; Ordering Provider:   Laisha Meléndez; Catalog Code:   albuterol ; Order Dt/Tm:   9/18/2018 12:31:41 EDT          allopurinol  :   allopurinol ; Status:   Documented ; Ordered As Mnemonic:   allopurinol 300 mg oral tablet ; Simple Display Line:   300 mg, 1 tabs, Oral, qAM, 0 Refill(s) ; Catalog Code:   allopurinol ; Order Dt/Tm:   9/18/2018 12:31:41 EDT          indomethacin  :   indomethacin ; Status:   Completed ; Ordered As Mnemonic:   indomethacin 50 mg oral capsule ; Simple Display Line:   50 mg, 1 caps, Oral, Daily, PRN: gout, 0 Refill(s) ; Ordering Provider:   Laisha Meléndze; Catalog Code:   indomethacin ; Order Dt/Tm:   9/18/2018 12:31:41 EDT          potassium chloride  :   potassium chloride ; Status:   Documented ; Ordered As Mnemonic:   Klor-Con M20 oral tablet, extended release ; Simple Display Line:   10 mEq, 0.5 tabs, Oral, qAM, 0 Refill(s) ;  Ordering Provider:   Laisha Meléndez; Catalog Code:   potassium chloride ; Order Dt/Tm:   9/18/2018 12:31:41 EDT

## 2022-10-21 NOTE — ED NOTES
ED Triage Note       ED Triage Adult Entered On:  8/1/2022 8:47 EDT    Performed On:  8/1/2022 8:43 EDT by Leigha Belcher               Triage   Numeric Rating Pain Scale :   0 = No pain   Chief Complaint :   continued nausea and malaise since dx c covid monday. denies sob. nad   [place] [facility] Mode of Arrival :   Private vehicle   Infectious Disease Documentation :   Document assessment   Temperature Oral :   36.6 degC(Converted to: 97.9 degF)    Heart Rate Monitored :   94 bpm   Respiratory Rate :   18 br/min   Systolic Blood Pressure :   274 mmHg   Diastolic Blood Pressure :   73 mmHg   SpO2 :   96 %   Oxygen Therapy :   Room air   Patient presentation :   None of the above   Leigha Owens 8/1/2022 8:43 EDT   Chief Complaint or Presentation suggest infection :   Yes   Leigha Belcher - 8/1/2022 8:51 EDT     Weight Dosing :   140.1 kg(Converted to: 308 lb 14 oz)    Height :   160 cm(Converted to: 5 ft 3 in)    Body Mass Index Dosing :   55 kg/m2   Leigha Belcher - 8/1/2022 8:43 EDT   DCP GENERIC CODE   Tracking Group :   ED [facility] Tracking Group   Tracking Acuity :   3   Leigha Owens 8/1/2022 8:43 EDT   ED General Section :   Document assessment   Pregnancy Status :   N/A   ED Allergies Section :   Document assessment   ED Reason for Visit Section :   Document assessment   ED Quick Assessment :   Patient appears awake, alert, oriented to baseline. Skin warm and dry. Moves all extremities. Respiration even and unlabored. Appears in no apparent distress. Leigha Belcher - 8/1/2022 8:43 EDT   ID Risk Screen Symptoms   Last 90 days COVID-19 ID :   Yes     Leigha Belcher - 8/1/2022 8:51 EDT     Allergies   (As Of: 8/1/2022 08:47:54 EDT)   Allergies (Active)   Adhesive Bandage  Estimated Onset Date:   Unspecified ; Reactions:   Burning, Rash, Itching ; Created By:   Mavis Mcgill; Reaction Status:   Active ; Category:   Drug ; Substance: Adhesive Bandage ; Type: Allergy ;  Severity:   Moderate ; Updated By:   Joana Schirmer; Reviewed Date:   8/1/2022 8:46 EDT      codeine  Estimated Onset Date:   Unspecified ; Reactions:   Vomiting ; Created By:   Radha Alvarez RN, Gurdeep Dempsey; Reaction Status:   Active ; Category:   Drug ; Substance:   codeine ; Type: Intolerance ; Severity:   Severe ; Updated By:   Gurdeep Lance RN; Reviewed Date:   8/1/2022 8:46 EDT        Psycho-Social   Last 3 mo, thoughts killing self/others :   Patient denies   Right click within box for Suspected Abuse policy link. :   None   Feels Safe Where Live :   Yes   ED Behavioral Activity Rating Scale :   4 - Quiet and awake (normal level of activity)   Doug Gaytan - 8/1/2022 8:43 EDT   ED Reason for Visit   (As Of: 8/1/2022 08:47:54 EDT)   Problems(Active)    Acid reflux (SNOMED CT  :811347509 )  Name of Problem:   Acid reflux ; Recorder:   Radha Alvarez RN, Gurdeep Dempsey; Confirmation:   Confirmed ; Classification:   Patient Stated ; Code:   443989434 ; Contributor System:   PowerChart ; Last Updated:   3/3/2021 14:24 EST ; Life Cycle Date:   3/3/2021 ; Life Cycle Status:   Active ; Vocabulary:   SNOMED CT        CHF (congestive heart failure) (SNOMED CT  :63103765 )  Name of Problem:   CHF (congestive heart failure) ; Recorder:   Radha Alvarez RN, Gurdeep Dempsey; Confirmation:   Confirmed ; Classification:   Patient Stated ; Code:   11296100 ; Contributor System:   PowerChart ; Last Updated:   3/3/2021 14:23 EST ; Life Cycle Date:   3/3/2021 ; Life Cycle Status:   Active ; Vocabulary:   SNOMED CT        Claustrophobia (SNOMED CT  :83032539 )  Name of Problem:   Claustrophobia ; Recorder:   Joana Schirmer; Confirmation:   Confirmed ; Classification:   Patient Stated ; Code:   05932848 ; Contributor System:   PowerChart ; Last Updated:   9/18/2018 12:41 EDT ;  Life Cycle Date:   9/18/2018 ; Life Cycle Status:   Active ; Vocabulary:   SNOMED CT   ; Comments:        9/18/2018 12:41 - Erick Harman RN, Abena Anish  DOES OK WITH ELEVATORS      Heart murmur (SNOMED CT  :542445161 )  Name of Problem:   Heart murmur ; Recorder:   Peewee Chávez RN, Memo Doe; Confirmation:   Confirmed ; Classification:   Patient Stated ; Code:   862161477 ; Contributor System:   PowerChart ; Last Updated:   9/18/2018 12:15 EDT ; Life Cycle Date:   9/18/2018 ; Life Cycle Status:   Active ; Vocabulary:   SNOMED CT        History of basal cell carcinoma (BCC) excision (SNOMED CT  :675710400 )  Name of Problem:   History of basal cell carcinoma (BCC) excision ; Recorder:   Gaye Forte RN, ROBERT REYEZ; Confirmation:   Confirmed ; Classification:   Patient Stated ; Code:   363684229 ; Contributor System:   PowerChart ; Last Updated:   11/16/2018 12:25 EST ; Life Cycle Date:   11/16/2018 ; Life Cycle Status:   Active ; Vocabulary:   SNOMED CT        Hx of gout (SNOMED CT  :896980884 )  Name of Problem:   Hx of gout ; Recorder:   Gaye Forte RN, ROBERT REYEZ; Confirmation:   Confirmed ; Classification:   Patient Stated ; Code:   822559498 ; Contributor System:   PowerChart ; Last Updated:   11/16/2018 12:24 EST ; Life Cycle Date:   11/16/2018 ; Life Cycle Status:   Active ; Vocabulary:   SNOMED CT        Hypertension (SNOMED CT  :9443227463 )  Name of Problem:   Hypertension ; Recorder:   Lisandra Joe RN, Angélica Quintana; Confirmation:   Confirmed ; Classification:   Patient Stated ; Code:   0709321171 ; Contributor System:   PowerChart ; Last Updated:   3/3/2021 14:24 EST ; Life Cycle Date:   3/3/2021 ; Life Cycle Status:   Active ; Vocabulary:   SNOMED CT        Mild asthma (SNOMED CT  :2797664707 )  Name of Problem:   Mild asthma ; Recorder:   Ilya Art; Confirmation:   Confirmed ; Classification:   Patient Stated ; Code:   6385816225 ; Contributor System:   PowerChart ; Last Updated:   9/18/2018 12:40 EDT ; Life Cycle Date:   9/18/2018 ; Life Cycle Status:   Active ; Vocabulary:   SNOMED CT        Mild depression (SNOMED CT  :405652180 )  Name of Problem:   Mild depression ;  Recorder:   Peewee Chávez RN, Marsh Maier; Confirmation:   Confirmed ; Classification: Patient Stated ; Code:   676799834 ; Contributor System:   PowerChart ; Last Updated:   9/18/2018 12:40 EDT ; Life Cycle Date:   9/18/2018 ; Life Cycle Status:   Active ; Vocabulary:   SNOMED CT        Neuropathy (SNOMED CT  :3097158036 )  Name of Problem:   Neuropathy ; Recorder:   Fco Dunaway; Confirmation:   Confirmed ; Classification:   Patient Stated ; Code:   2739026419 ; Contributor System:   PowerChart ; Last Updated:   9/18/2018 12:41 EDT ; Life Cycle Date:   9/18/2018 ; Life Cycle Status:   Active ; Vocabulary:   SNOMED CT        Osteoarthritis (SNOMED CT  :7717992149 )  Name of Problem:   Osteoarthritis ; Recorder:   Fco Dunaway; Confirmation:   Confirmed ; Classification:   Patient Stated ; Code:   9684140998 ; Contributor System:   PowerChart ; Last Updated:   11/16/2018 12:25 EST ; Life Cycle Status:   Active ; Vocabulary:   SNOMED CT   ; Comments:        11/16/2018 12:25 - SUSANNE ABDALLA SUSAN L  painful knees      Psoriasis (SNOMED CT  :14312360 )  Name of Problem:   Psoriasis ; Recorder:   Fco Dunaway; Confirmation:   Confirmed ; Classification:   Patient Stated ; Code:   95517357 ; Contributor System:   PowerChart ; Last Updated:   9/18/2018 12:40 EDT ; Life Cycle Date:   9/18/2018 ; Life Cycle Status:   Active ; Vocabulary:   SNOMED CT        Rash (SNOMED CT  :038042629 )  Name of Problem:   Rash ; Recorder:   SUSANNE ABDALLA SUSAN L; Confirmation:   Confirmed ; Classification:   Patient Stated ; Code:   544791619 ; Contributor System:   PowerChart ; Last Updated:   11/16/2018 12:24 EST ; Life Cycle Status:   Active ; Vocabulary:   SNOMED CT   ; Comments:        11/16/2018 12:24 - SUSANNE ABDALLA SUSAN L  both hips    1/3/2019 9:32 - Jose R Gaona RN, SUSAN L  chronic      Rosacea (SNOMED CT  :8459316689 )  Name of Problem:   Rosacea ; Recorder:   Fco Dunaway; Confirmation:   Confirmed ; Classification:   Patient Stated ; Code:   8697719775 ; Contributor System:   PowerChart ;  Last Updated: 9/18/2018 12:40 EDT ; Life Cycle Date:   9/18/2018 ; Life Cycle Status:   Active ; Vocabulary:   SNOMED CT        SVT (supraventricular tachycardia) (SNOMED CT  :60205286 )  Name of Problem:   SVT (supraventricular tachycardia) ; Recorder:   Maguila Lay; Confirmation:   Confirmed ; Classification:   Patient Stated ; Code:   45472725 ; Contributor System:   Need Fixed ; Last Updated:   3/3/2021 14:23 EST ;  Life Cycle Status:   Active ; Vocabulary:   SNOMED CT   ; Comments:        3/3/2021 14:23 - Estiven Pino RN, [facility] now        Diagnoses(Active)    Nausea  Date:   8/1/2022 ; Diagnosis Type:   Reason For Visit ; Confirmation:   Complaint of ; Clinical Dx:   Nausea ; Classification:   Medical ; Clinical Service:   Emergency medicine ; Code:   PNED ; Probability:   0 ; Diagnosis Code:   [id number]

## 2022-10-21 NOTE — ED PROVIDER NOTES
Nausea        Patient:   Priscila Aquino            MRN: [id number]            FIN: [id number]               Age:   76 years     Sex:  Female     :  1953   Associated Diagnoses:   Nausea; Superficial bruising of abdominal wall; Transaminitis; Hypokalemia; Pneumonia   Author:   Sahil MCFADDEN      Basic Information   Time seen: Provider Seen (ST)   ED Provider/Time:    Sahil MCFADDEN / 2022 08:50  . Additional information: Chief Complaint from Nursing Triage Note   Chief Complaint  Chief Complaint: continued nausea and malaise since dx c covid monday. denies sob. nad (22 08:43:00). History of Present Illness   This is a 42-year-old female with history of atrial fibrillation newly on Eliquis in addition to CHF who presents for evaluation of nausea. She tested positive for COVID last Monday. Over the weekend she had some chills. She was hospitalized during that weekend but not tested for COVID. She was seen at an [facility] and believes she completed a course of an antiviral.  Says she is out of Zofran and says this normally helps with nausea, but has not have anything for her nausea at this time. Denies any vomiting, abdominal pain or diarrhea. No dysuria. Denies fever or chills. She still has a productive cough. She is not short of breath. Does report that she has a bruise to her right lower quadrant and site of Lovenox injection. It is not painful, but she does have a large area of ecchymosis. Review of Systems   Constitutional symptoms:  No fever,    Respiratory symptoms:  Cough. Gastrointestinal symptoms:  Nausea, no abdominal pain, no vomiting, no diarrhea. Genitourinary symptoms:  No dysuria,    Hematologic/Lymphatic symptoms:  Bruising tendency. Additional review of systems information: All other systems reviewed and otherwise negative. Health Status   Allergies:     Allergic Reactions (Selected)  Moderate  Adhesive Bandage- Burning, rash and itching. Nonallergic Reactions (Selected)  Severe  Codeine- Vomiting. .   Medications:  (Selected)   Inpatient Medications  Ordered  Zofran: 4 mg, 2 mL, IV Push, Once  Prescriptions  Prescribed  Eliquis 5 mg oral tablet: 5 mg, 1 tabs, Oral, BID, 60 tabs, 0 Refill(s)  Documented Medications  Documented  Klor-Con M20 oral tablet, extended release: 10 mEq, 0.5 tabs, Oral, qAM, 0 Refill(s)  Melatonin: 5 mg, Oral, Once a Day (at bedtime), 0 Refill(s)  allopurinol 300 mg oral tablet: 300 mg, 1 tabs, Oral, qAM, 0 Refill(s)  bumetanide 2 mg oral tablet: 2 mg, 1 tabs, Oral, qAM, 0 Refill(s)  diclofenac 1% topical gel: 1 anna, Topical, QID, PRN: pain, 100 g, 0 Refill(s)  molnupiravir 200 mg oral capsule: TAKE 4 CAPSULES BY MOUTH EVERY 12 HOURS FOR 5 DAYS  montelukast 10 mg oral tablet: 10 mg, 1 tabs, Oral, qAM, 0 Refill(s). Past Medical/ Family/ Social History   Medical history: Reviewed as documented in chart. Surgical history: Reviewed as documented in chart. Family history: Not significant. Social history: Reviewed as documented in chart. Problem list:    Active Problems (16)  Acid reflux   CHF (congestive heart failure)   Claustrophobia   Heart murmur   History of basal cell carcinoma (BCC) excision   History of SARS-CoV-2   Hx of gout   Hypertension   Mild asthma   Mild depression   Neuropathy   Osteoarthritis   Psoriasis   Rash   Rosacea   SVT (supraventricular tachycardia)   , per nurse's notes. Physical Examination               Vital Signs   Vital Signs   6/1/7443 5:76 EDT Systolic Blood Pressure 761 mmHg    Diastolic Blood Pressure 73 mmHg    Temperature Oral 36.6 degC    Heart Rate Monitored 94 bpm    Respiratory Rate 18 br/min    SpO2 96 %   . Measurements   8/1/2022 8:47 EDT Body Mass Index est pk 54.73 kg/m2    Body Mass Index Measured 54.73 kg/m2   8/1/2022 8:43 EDT Height/Length Measured 160 cm    Weight Dosing 140.1 kg   .    Basic Oxygen Information   8/1/2022 8:53 EDT Oxygen Therapy Room air   8/1/2022 8:43 EDT Oxygen Therapy Room air    SpO2 96 %   . General:  Alert, no acute distress. Skin:  Warm, dry, pink, intact, Moderate sized superficial ecchymosis/hematoma to right lower quadrant of the abdomen. Patient reports this was in the site of her Lovenox injection. It is not firm or tender to touch. No active bleeding. Head:  Normocephalic, atraumatic. Neck:  Supple, trachea midline, no tenderness. Eye:  Pupils are equal, round and reactive to light, extraocular movements are intact, normal conjunctiva. Ears, nose, mouth and throat:  Oral mucosa moist.   Cardiovascular:  Regular rate and rhythm, No murmur, Normal peripheral perfusion, No edema. Respiratory:  Lungs are clear to auscultation, respirations are non-labored, breath sounds are equal, Symmetrical chest wall expansion. Chest wall:  No tenderness. Back:  Nontender, Normal range of motion. Musculoskeletal:  Normal ROM, normal strength, no swelling. Gastrointestinal:  Soft, Nontender, Non distended, Guarding: Negative, Rebound: Negative. Neurological:  Alert and oriented to person, place, time, and situation, No focal neurological deficit observed. Lymphatics:  No lymphadenopathy. Medical Decision Making   Differential Diagnosis:  Pancreatitis, hepatitis, electrolyte abnormality, viral syndrome, Pneumonia. Documents reviewed:  Emergency department nurses' notes, emergency department records, prior records. Electrocardiogram:  Emergency Provider interpretation performed by me, See ECG ED Review.     Results review:  Lab results : Lab View   8/1/2022 9:58 EDT Appear U POC Clear    Color U POC Yellow    Bili U POC Negative    Blood U POC Negative    Glucose U POC Negative mg/dL    Ketones U POC Trace mg/dL    Leuk Est U POC Negative    Nitrite U POC Negative    pH U POC 6.5    Protein U POC >=300    Spec Grav U POC >=1.030    Urobilin U POC 1.0 EU/dL   8/1/2022 9:51 EDT Estimated Creatinine Clearance 82.61 mL/min   8/1/2022 9:28 EDT Glucose .0 mg/dL  HI   8/1/2022 9:17 EDT WBC 5.6 x10e3/mcL    RBC 4.94 x10e6/mcL    Hgb 13.9 g/dL    HCT 44.0 %    MCV 89.1 fL    MCH 28.1 pg    MCHC 31.6 g/dL  LOW    RDW 13.6 %    Platelet 263 G32R7/JHE    MPV 11.6 fL    Neutro Auto 61.6 %    Neutro Absolute 3.5 x10e3/mcL    Immature Grans Percent 0.2 %    Immature Grans Absolute 0.01 x10e3/mcL    Lymph Auto 26.0 %    Lymph Absolute 1.5 x10e3/mcL    Mono Auto 10.3 %    Mono Absolute 0.6 x10e3/mcL    Eosinophil Percent 1.4 %    Eos Absolute 0.1 x10e3/mcL    Basophil Auto 0.5 %    Baso Absolute 0.0 x10e3/mcL    Sodium Lvl 144 mmol/L    Potassium Lvl 3.3 mmol/L  LOW    Chloride 101 mmol/L    CO2 34 mmol/L  HI    Glucose Random 129 mg/dL  HI    BUN 17 mg/dL    Creatinine Lvl 0.9 mg/dL    AGAP 9 mmol/L    Osmolality Calc 290 mOsm/kg  HI    Calcium Lvl 9.2 mg/dL    Protein Total 6.8 g/dL    Albumin Lvl 3.5 g/dL    Globulin Calc 3.3 g/dL    AG Ratio Calc 1.00    Alk Phos 135 unit/L  HI    AST 66 unit/L  HI    ALT 75 unit/L  HI    eGFR 70 mL/min/1.73mÂ²  LOW    Bili Total 0.62 mg/dL   8/1/2022 8:47 EDT Estimated Creatinine Clearance 106.21 mL/min   . Radiology results:  Rad Results (ST)   XR Chest 1 View Portable  ?  08/01/22 09:23:04  XR Chest 1 View Portable 08/01/22    INDICATION: Other abnormalities of breathing    COMPARISON: 23 July, 2022    TECHNIQUE: Single view    FINDINGS: Interval clearing of pulmonary infiltrates with some residual  infiltrate present most apparent in the right mid zone and in the left upper  lobe. The left upper lobe infiltrate is partially obscured by artifact. The  cardiac size is normal. The pulmonary vasculature is not congested. IMPRESSION: There is artifact over the chest. Right mid zone and left upper lobe  clinical correlation  Recommended.   ?  Signed By: Nino MARQUES  .   Will screen with basic labs to rule out any significant electrolyte disturbance, transaminitis or anemia. Patient does not look dehydrated. She has normal vital signs. We will give IV Zofran. Nausea likely secondary to mild persistent viral syndrome. She does report a productive cough we will obtain a chest x-ray. Lungs are clear and room air sats are greater than 95%. She does not appear short of breath      Reexamination/ Reevaluation   Given evidence of pulmonary infiltrate, I will put the patient on Levaquin. She does report her cough is more productive. She is got mild transaminitis. No recent prior for comparison. Recommend follow-up with her PCP for recheck. Potassium is a little low which was orally repleted. Plan for discharge with follow-up with her regular doctor      Impression and Plan   Diagnosis   Nausea (JMS72-ZH R11.0, Discharge, Medical)   Superficial bruising of abdominal wall ([id number] S30. 1XXA, Discharge, Medical)   Transaminitis (HZL37-SW R74.01, Discharge, Medical)   Pneumonia (NTB50-SY J18.9, Discharge, Medical)   Hypokalemia (UFC04-KZ E87.6, Discharge, Medical)   Plan   Condition: Improved. Disposition: Discharged: Time  8/1/2022 10:08:00, to home. Prescriptions: Launch prescriptions   Pharmacy:  Levaquin 750 mg oral tablet (Prescribe): 750 mg, 1 tabs, Oral, Daily, for 5 days, 5 tabs, 0 Refill(s)  potassium chloride 20 mEq oral tablet, extended release (Prescribe): 20 mEq, 1 tabs, Oral, BID, for 5 days, 10 tabs, 0 Refill(s), Launch prescriptions   Pharmacy:  Zofran ODT 4 mg oral tablet, disintegrating (Prescribe): 4 mg, 1 tabs, Oral, q8hr, for 3 days, PRN: nausea/vomiting, 9 tabs, 0 Refill(s). Patient was given the following educational materials: Pneumonia, Adult, Easy-to-Read ([id number]), Hypokalemia. Follow up with: Follow up with primary care provider Within 1 week Follow-up with your doctor for recheck. You will need to have your potassium and your liver enzymes rechecked in 1 to 2 weeks.   Return to the ER with worsening symptoms. Counseled: I had a detailed discussion with the patient and/or guardian regarding the historical points/exam findings supporting the discharge diagnosis and need for outpatient followup. Discussed the need to return to the ER if symptoms persist/worsen, or for any questions/concerns that arise at home.     Signature Line     Electronically Signed on 08/01/2022 10:09 AM EDT   ________________________________________________   Jason MCFADDEN               Modified by: Jason MCFADDEN on 08/01/2022 09:56 AM EDT      Modified by: Jason MCFADDEN on 08/01/2022 10:09 AM EDT